# Patient Record
(demographics unavailable — no encounter records)

---

## 2024-10-07 NOTE — PHYSICAL EXAM
1.  Cataract OU:  Visually Significant discussed the risks benefits alternatives and limitations of cataract surgery. The patient stated a full understanding and a desire to proceed with the procedure. The patient will need to return for preop appointment with cataract measurements and to have any additional questions answered and start pre-operative eye drops as directed. Patient did not see PMG today. OS then ODPhaco PCLOtherwise follow-up2. DM Type II without sign of diabetic retinopathy and no blot heme on dilated retinal examination today OU No Macular Edema:  Discussed the pathophysiology of diabetes and its effect on the eye and risk of blindness. Stressed the importance of strong glucose control. Advised of importance of at least yearly dilated examinations but to contact us immediately for any problems or concerns. 3. Type II diabetes controlled by oral medications. 4.  Dry Eyes OU -- Recommended to patient to use Artificial Tears BID OU5. Pinguecula OU -- Use of sunglasses when exposed to UV light and observation is recommended. 6. Return for an appointment for H and P with Dr. Marquis Ochoa. [Chaperone Present] : A chaperone was present in the examining room during all aspects of the physical examination [IUD String] : an IUD string was noted